# Patient Record
Sex: MALE | URBAN - METROPOLITAN AREA
[De-identification: names, ages, dates, MRNs, and addresses within clinical notes are randomized per-mention and may not be internally consistent; named-entity substitution may affect disease eponyms.]

---

## 2020-01-01 ENCOUNTER — HOSPITAL ENCOUNTER (EMERGENCY)
Facility: CLINIC | Age: 35
End: 2020-03-11
Attending: EMERGENCY MEDICINE | Admitting: EMERGENCY MEDICINE

## 2020-01-01 DIAGNOSIS — I46.9 CARDIOPULMONARY ARREST (H): ICD-10-CM

## 2020-01-01 DIAGNOSIS — T71.162A: ICD-10-CM

## 2020-01-01 PROCEDURE — 99283 EMERGENCY DEPT VISIT LOW MDM: CPT | Performed by: EMERGENCY MEDICINE

## 2020-01-01 PROCEDURE — 99285 EMERGENCY DEPT VISIT HI MDM: CPT | Mod: Z6 | Performed by: EMERGENCY MEDICINE

## 2020-03-12 NOTE — ED PROVIDER NOTES
History     Chief Complaint   Patient presents with     Cardiac Arrest     The history is provided by the EMS personnel.     Tim Vaughn is a 34 year old male who presents to the emergency department via EMS after cardiac arrest. The patient was found by his family hanging from a ceiling rafter in his parents basement sometime around 1700 this afternoon. A call to first responders was placed at 1742 after the family had cut the patient down and concluded that he still felt warm to the touch. He was laying on a concrete floor after being taken down. They performed CPR until EMS arrived. Once EMS got there they continued CPR using a DEBBIE and gave him six rounds of epinephrine. He was without a pulse and asystole for about one hour and ten minutes, and was still this way upon arrival in the ED. EMS say he was cool to the touch, had mottled lower extremities and fixed and dilated pupils. [facility] was called and advised that he had little chance of survival, but the family wanted the patient to be brought here.   EMS mention that the patient has a history of depression and last felt suicidal in 04/2019. He has refused to see counselors or take his medications according to EMS.    Allergies:  Allergies not on file    Problem List:    There are no active problems to display for this patient.       Past Medical History:    No past medical history on file.    Past Surgical History:    No past surgical history on file.    Family History:    No family history on file.    Social History:  Marital Status:  Single [1]  Social History     Tobacco Use     Smoking status: Not on file   Substance Use Topics     Alcohol use: Not on file     Drug use: Not on file        Medications:    No current outpatient medications on file.        Review of Systems   Unable to perform ROS: Acuity of condition       Physical Exam          Physical Exam  Vitals signs and nursing note reviewed.   Constitutional:       General: He is  not in acute distress.     Appearance: He is well-developed. He is not diaphoretic.      Comments: Unresponsive, cool extremities, warm centrally, obvious large ligature barbara around the neck.   HENT:      Head: Normocephalic.      Nose: Nose normal.      Mouth/Throat:      Comments: ET tube in place with splattered blood around the face  Eyes:      General: No scleral icterus.     Comments: Pupils are large, fixed and dilated   Neck:      Musculoskeletal: Neck rigidity present.      Comments: Large deep ligature barbara purpleish in color over the anterior neck and purplish hue over a large patch on the posterior neck.  Cardiovascular:      Comments: No cardiac sounds heard, pulseless  Pulmonary:      Comments: No respiratory effort.  Chest rise with bagging  Abdominal:      General: There is no distension.      Comments: No trauma   Genitourinary:     Penis: Normal.    Musculoskeletal:      Comments: Stiff extremities centrally but mobile fingers and toes   Skin:     Findings: No rash.      Comments: Cool extremities peripherally   Neurological:      Comments: Unresponsive, GCS of 3   Psychiatric:      Comments: Unable to evaluate         ED Course        Procedures    This patient meets the criteria for critical care.  Time spent on critical care excluding procedures:20 min           No results found for this or any previous visit (from the past 24 hour(s)).    Medications - No data to display    Assessments & Plan (with Medical Decision Making)  Tim Vaughn is a 34 year old male who presents to the emergency department via EMS following cardiac arrest secondary to a suicide by hanging.  The patient was without a pulse and in asystole for at least an hour prior to arrival.  He had had 6 rounds of epinephrine prior to arrival here.  On arrival the patient continued to be pulseless and in asystole.  Bedside ultrasound showed no cardiac activity.  Although peripherally his extremities were cool centrally he was  warm with a rectal temperature of 97.  His pupils were fixed and dilated and his face was cyanotic.  At this point I called this time of death secondary to cardiopulmonary arrest secondary to suicide by hanging.  Medical examiner was contacted.     I have reviewed the nursing notes.    I have reviewed the findings, diagnosis, plan and need for follow up with the patient.      New Prescriptions    No medications on file       Final diagnoses:   Cardiopulmonary arrest (H)   Suicide by hanging, initial encounter (H)       This document serves as a record of services personally performed by Taiwo Santa MD. It was created on their behalf by Chacha Manning, a trained medical scribe. The creation of this record is based on the provider's personal observations and the statements of the patient. This document has been checked and approved by the attending provider.  Note: Chart documentation done in part with Dragon Voice Recognition software. Although reviewed after completion, some word and grammatical errors may remain.  3/11/2020   [facility] EMERGENCY DEPARTMENT     Taiwo Santa MD  03/11/20 2048

## 2020-03-12 NOTE — ED TRIAGE NOTES
Found by parents hanging by neck in basement. Cut down by Mother. CPR started. No pulse on EMS arrival. Pt was intubated with an LMA, Lucus running on arrival. IO in right shin. Pt had 6 rounds of epi with no response by EMS. Has had one hour of CPR PTA.Pt put on cont cardiac monitor showing asystole on arrival. Extremites cold, Fixed diolated pupils. Bedside US by Dr Carrasquillo showing no activity. Ligature marks around neck. Pt last seen alive yest evening. See code blue sheet.